# Patient Record
Sex: MALE
[De-identification: names, ages, dates, MRNs, and addresses within clinical notes are randomized per-mention and may not be internally consistent; named-entity substitution may affect disease eponyms.]

---

## 2018-07-12 ENCOUNTER — HOSPITAL ENCOUNTER (EMERGENCY)
Dept: HOSPITAL 25 - UCEAST | Age: 28
Discharge: HOME | End: 2018-07-12
Payer: COMMERCIAL
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
## 2018-07-12 DIAGNOSIS — Z23: ICD-10-CM

## 2018-07-12 DIAGNOSIS — S30.813A: ICD-10-CM
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
## 2018-07-12 DIAGNOSIS — S31.33XA: Primary | ICD-10-CM

## 2018-07-12 PROCEDURE — 90715 TDAP VACCINE 7 YRS/> IM: CPT

## 2018-07-12 PROCEDURE — 99202 OFFICE O/P NEW SF 15 MIN: CPT

## 2018-07-12 PROCEDURE — G0463 HOSPITAL OUTPT CLINIC VISIT: HCPCS

## 2018-07-12 NOTE — UC
Bite Injury/Animal HPI





- HPI Summary


HPI Summary: 





28 yo male presents with dog bite to scrotum. He tells me that last night he 

was working () and when he went to the door of a house their dog 

jumped at him. He is unsure if the dog bit him or scratched him, but he 

sustained two abrasions/punctures to his scrotum. He cleaned the area and had 

mild bleeding. Health department has not been contacted yet. Last tetanus was 

around 2010. He has not talked with the dog owners and knows nothing about the 

dog.





- History of Current Complaint


Stated Complaint: DOG BITE


Time Seen by Provider: 07/12/18 14:22


Hx Obtained From: Patient


Severity Currently: Moderate


Severity Initially: Mild


Pain Intensity: 3


Pain Scale Used: 0-10 Numeric


Onset/Duration: Sudden Onset


Type of Bite: Animal


Has Animal Been Immunized?: Unknown


Character: Puncture, Abrasion/Laceration





- Allergies/Home Medications


Allergies/Adverse Reactions: 


 Allergies











Allergy/AdvReac Type Severity Reaction Status Date / Time


 


No Known Allergies Allergy   Verified 07/12/18 14:27














PMH/Surg Hx/FS Hx/Imm Hx





- Additional Past Medical History


Additional PMH: 





None


Previously Healthy: Yes





- Surgical History


Surgical History: None





- Family History


Known Family History: Positive: None





- Social History


Occupation: Employed Full-time


Lives: With Family


Alcohol Use: Occasionally


Substance Use Type: None


Smoking Status (MU): Never Smoked Tobacco





Review of Systems


Constitutional: Negative


Skin: Other - Dog inflcited wound to scrotum


Respiratory: Negative


Cardiovascular: Negative


Neurovascular: Negative


Neurological: Negative


Psychological: Negative


All Other Systems Reviewed And Are Negative: Yes





Physical Exam





- Summary


Physical Exam Summary: 





GENERAL: NAD. WDWN. No pain distress.


SKIN: Two 1-2mm punctures/abrasions to anterior scrotum. <1mm depth. No 

streaking, bleeding, or drainage.


NECK: Supple. Nontender. No lymphadenopathy. 


CHEST:  No accessory muscle use. Breathing comfortably and in no distress.


CV:  Pulses intact


NEURO: Alert. CN II-XII grossly intact.


PSYCH: Age appropriate behavior.





Triage Information Reviewed: Yes


Vital Signs: 





Vital Signs:











Temp Pulse Resp BP Pulse Ox


 


 98.8 F   84   16   151/98   97 


 


 07/12/18 14:20  07/12/18 14:20  07/12/18 14:20  07/12/18 14:20  07/12/18 14:20














Bite Injury Course/Dx





- Course


Course Of Treatment: Unknown if dog bite or scratch - therefre I will treat his 

wound as a dog BITE. We will contact the health department to notify them of 

this dog bite/scratch. tdap updated today. Will also place him on augmentin as 

per animal bite guidelines.





- Differential Dx/Diagnosis


Provider Diagnoses: Dog bite to scrotum





Discharge





- Sign-Out/Discharge


Documenting (check all that apply): Patient Departure





- Discharge Plan


Condition: Stable


Disposition: HOME


Prescriptions: 


Amoxicillin/Clavulanate TAB* [Augmentin *] 875 mg PO BID #14 tab


Patient Education Materials:  Animal Bite (ED)


Referrals: 


Abner Anderson MD [Primary Care Provider] - 


Additional Instructions: 


If you develop a fever, shortness of breath, chest pain, new or worsening 

symptoms - please call your PCP or go to the ED.


 





Your blood pressure was high at todays visit. Please see your primary provider 

within 4 weeks for recheck and re-evaluation.








- Billing Disposition and Condition


Condition: STABLE


Disposition: Home

## 2019-04-18 ENCOUNTER — HOSPITAL ENCOUNTER (EMERGENCY)
Dept: HOSPITAL 25 - UCEAST | Age: 29
Discharge: HOME | End: 2019-04-18
Payer: SELF-PAY

## 2019-04-18 VITALS — DIASTOLIC BLOOD PRESSURE: 106 MMHG | SYSTOLIC BLOOD PRESSURE: 153 MMHG

## 2019-04-18 DIAGNOSIS — L03.116: Primary | ICD-10-CM

## 2019-04-18 DIAGNOSIS — F17.210: ICD-10-CM

## 2019-04-18 PROCEDURE — 99211 OFF/OP EST MAY X REQ PHY/QHP: CPT

## 2019-04-18 PROCEDURE — G0463 HOSPITAL OUTPT CLINIC VISIT: HCPCS

## 2019-04-18 NOTE — UC
Skin Complaint HPI





- HPI Summary


HPI Summary: 





For the past week or so L outer leg has had a growing irritation and bump.  

Today feels painful and it was draining last night.  he does not shave.





- History of Current Complaint


Chief Complaint: UCSkin


Time Seen by Provider: 04/18/19 21:38


Stated Complaint: SORE ON LEG


Hx Obtained From: Patient


Onset/Duration: Gradual Onset


Pain Intensity: 6


Pain Scale Used: 0-10 Numeric


Location: Discrete


Aggravating Factor(s): Clothing


Alleviating Factor(s): Unknown





- Allergy/Home Medications


Allergies/Adverse Reactions: 


 Allergies











Allergy/AdvReac Type Severity Reaction Status Date / Time


 


No Known Allergies Allergy   Verified 04/18/19 21:23











Home Medications: 


 Home Medications





Lisinopril TAB* [Prinivil TAB 5 MG*] 5 mg PO DAILY 04/18/19 [History Confirmed 

04/18/19]











PMH/Surg Hx/FS Hx/Imm Hx





- Additional Past Medical History


Additional PMH: 





psoriasis





- Surgical History


Surgical History: None





- Family History


Known Family History: Positive: None





- Social History


Alcohol Use: Weekly


Substance Use Type: None


Smoking Status (MU): Light Every Day Tobacco Smoker


Amount Used/How Often: 4 sig/day





- Immunization History


Most Recent Tetanus Shot: unknown, thinks 2012





Review of Systems


All Other Systems Reviewed And Are Negative: Yes


Constitutional: Negative: Fever, Chills


Skin: Positive: Other - 'large bump'.  Negative: Rash, Bruising


Musculoskeletal: Negative: Arthralgia





Physical Exam


Triage Information Reviewed: Yes


Appearance: Well-Appearing


Vital Signs: 


 Initial Vital Signs











Temp  99.4 F   04/18/19 21:16


 


Pulse  101   04/18/19 21:16


 


Resp  18   04/18/19 21:16


 


BP  153/106   04/18/19 21:16


 


Pulse Ox  98   04/18/19 21:16











Vital Signs Reviewed: Yes


Skin: Positive: Other - L outer calf has erythematous induration, some 

tenderness w/ little fluctuance.





Course/Dx





- Course


Course Of Treatment: 





Cellulitic area w/ induration but not quite abscess.  DRaining on its own and 

offered pt. I&D.  Pt hesitant and prefers antibx and if in 24-48hrs it worsens 

he would like to return for I&D as it may be more fluctuant at that time. BP 

elevated, he has meds a home he has not taken.





- Differential Diagnoses - Skin Complaint


Differential Diagnoses: Abscess, Cellulitis





- Diagnoses


Provider Diagnosis: 


 Cellulitis








Discharge





- Sign-Out/Discharge


Documenting (check all that apply): Patient Departure


All imaging exams completed and their final reports reviewed: No Studies





- Discharge Plan


Condition: Good


Disposition: HOME


Prescriptions: 


Sulfamethox/Trimethoprim DS* [Bactrim /160 TAB*] 1 tab PO BID 7 Days #14 

tab


Patient Education Materials:  Cellulitis (ED)


Referrals: 


Abner Anderson MD [Primary Care Provider] - 


Additional Instructions: 


we discussed you returning to recheck area and see if you need an incision and 

drainage.


please review your blood pressure w/ your pcp





- Billing Disposition and Condition


Condition: GOOD


Disposition: Home

## 2020-03-21 ENCOUNTER — HOSPITAL ENCOUNTER (EMERGENCY)
Dept: HOSPITAL 25 - UCEAST | Age: 30
Discharge: HOME | End: 2020-03-21
Payer: COMMERCIAL

## 2020-03-21 VITALS — DIASTOLIC BLOOD PRESSURE: 83 MMHG | SYSTOLIC BLOOD PRESSURE: 153 MMHG

## 2020-03-21 DIAGNOSIS — F17.210: ICD-10-CM

## 2020-03-21 DIAGNOSIS — L03.211: Primary | ICD-10-CM

## 2020-03-21 PROCEDURE — G0463 HOSPITAL OUTPT CLINIC VISIT: HCPCS

## 2020-03-21 PROCEDURE — 99212 OFFICE O/P EST SF 10 MIN: CPT

## 2020-03-21 NOTE — UC
Skin Complaint HPI





- HPI Summary


HPI Summary: 





pt has a small raised bump on his left cheek. it is red and swollen and feels 

like a "golf ball" underneath. he noticed it yesterday; states he had something 

similar on hs leg last year and it turned into an infection.





- History of Current Complaint


Chief Complaint: UCSkin


Time Seen by Provider: 03/21/20 14:59


Stated Complaint: SKIN COMPLAINT


Hx Obtained From: Patient





- Allergy/Home Medications


Allergies/Adverse Reactions: 


 Allergies











Allergy/AdvReac Type Severity Reaction Status Date / Time


 


No Known Allergies Allergy   Verified 04/18/19 21:23











Home Medications: 


 Home Medications





Cephalexin CAP* [Keflex CAP*] 500 mg PO BID 7 Days #14 cap 03/21/20 [Rx]


Ibuprofen 200 mg PO 03/21/20 [History]











PMH/Surg Hx/FS Hx/Imm Hx


Previously Healthy: Yes





- Surgical History


Surgical History: None





- Family History


Known Family History: Positive: None





- Social History


Alcohol Use: Weekly


Substance Use Type: None


Smoking Status (MU): Light Every Day Tobacco Smoker


Amount Used/How Often: 4 sig/day





- Immunization History


Most Recent Tetanus Shot: unknown, thinks 2012





Review of Systems


All Other Systems Reviewed And Are Negative: Yes


Constitutional: Negative: Fever


Skin: Positive: Other - R cheek redness





Physical Exam


Triage Information Reviewed: Yes


Appearance: Well-Appearing


Vital Signs Reviewed: Yes


Skin: Positive: Other - R cheek tender w/ redness, inner cheek nontender.





Course/Dx





- Course


Course Of Treatment: 





R cheek cellulitis mild, afebrile. will tx w/ antibx.





- Differential Diagnoses - Skin Complaint


Differential Diagnoses: Cellulitis





- Diagnoses


Provider Diagnosis: 


 Cellulitis








Discharge ED





- Sign-Out/Discharge


Documenting (check all that apply): Patient Departure


All imaging exams completed and their final reports reviewed: No Studies





- Discharge Plan


Condition: Good


Disposition: HOME


Prescriptions: 


Cephalexin CAP* [Keflex CAP*] 500 mg PO BID 7 Days #14 cap


Patient Education Materials:  Cellulitis (DC)


Referrals: 


Onur Aguila DO [Primary Care Provider] - 


Additional Instructions: 


warm compresses can also help





- Billing Disposition and Condition


Condition: GOOD


Disposition: Home